# Patient Record
Sex: FEMALE | Race: BLACK OR AFRICAN AMERICAN | HISPANIC OR LATINO | Employment: FULL TIME | URBAN - METROPOLITAN AREA
[De-identification: names, ages, dates, MRNs, and addresses within clinical notes are randomized per-mention and may not be internally consistent; named-entity substitution may affect disease eponyms.]

---

## 2021-07-27 ENCOUNTER — IMMUNIZATION (OUTPATIENT)
Dept: INTERNAL MEDICINE | Facility: CLINIC | Age: 46
End: 2021-07-27
Payer: OTHER GOVERNMENT

## 2021-07-27 DIAGNOSIS — Z23 NEED FOR VACCINATION: Primary | ICD-10-CM

## 2021-07-27 PROCEDURE — 91300 COVID-19, MRNA, LNP-S, PF, 30 MCG/0.3 ML DOSE VACCINE: CPT | Mod: PBBFAC

## 2021-08-17 ENCOUNTER — IMMUNIZATION (OUTPATIENT)
Dept: PRIMARY CARE CLINIC | Facility: CLINIC | Age: 46
End: 2021-08-17
Payer: OTHER GOVERNMENT

## 2021-08-17 DIAGNOSIS — Z23 NEED FOR VACCINATION: Primary | ICD-10-CM

## 2021-08-17 PROCEDURE — 91300 COVID-19, MRNA, LNP-S, PF, 30 MCG/0.3 ML DOSE VACCINE: ICD-10-PCS | Mod: S$GLB,,, | Performed by: INTERNAL MEDICINE

## 2021-08-17 PROCEDURE — 0002A COVID-19, MRNA, LNP-S, PF, 30 MCG/0.3 ML DOSE VACCINE: ICD-10-PCS | Mod: CV19,S$GLB,, | Performed by: INTERNAL MEDICINE

## 2021-08-17 PROCEDURE — 0002A COVID-19, MRNA, LNP-S, PF, 30 MCG/0.3 ML DOSE VACCINE: CPT | Mod: CV19,S$GLB,, | Performed by: INTERNAL MEDICINE

## 2021-08-17 PROCEDURE — 91300 COVID-19, MRNA, LNP-S, PF, 30 MCG/0.3 ML DOSE VACCINE: CPT | Mod: S$GLB,,, | Performed by: INTERNAL MEDICINE

## 2022-04-21 ENCOUNTER — OFFICE VISIT (OUTPATIENT)
Dept: OPTOMETRY | Facility: CLINIC | Age: 47
End: 2022-04-21

## 2022-04-21 DIAGNOSIS — H52.4 PRESBYOPIA: Primary | ICD-10-CM

## 2022-04-21 PROCEDURE — 92004 PR EYE EXAM, NEW PATIENT,COMPREHESV: ICD-10-PCS | Mod: S$PBB,,, | Performed by: OPTOMETRIST

## 2022-04-21 PROCEDURE — 92015 DETERMINE REFRACTIVE STATE: CPT | Mod: ,,, | Performed by: OPTOMETRIST

## 2022-04-21 PROCEDURE — 99999 PR PBB SHADOW E&M-EST. PATIENT-LVL I: ICD-10-PCS | Mod: PBBFAC,,, | Performed by: OPTOMETRIST

## 2022-04-21 PROCEDURE — 92004 COMPRE OPH EXAM NEW PT 1/>: CPT | Mod: S$PBB,,, | Performed by: OPTOMETRIST

## 2022-04-21 PROCEDURE — 99211 OFF/OP EST MAY X REQ PHY/QHP: CPT | Mod: PBBFAC | Performed by: OPTOMETRIST

## 2022-04-21 PROCEDURE — 92015 PR REFRACTION: ICD-10-PCS | Mod: ,,, | Performed by: OPTOMETRIST

## 2022-04-21 PROCEDURE — 99999 PR PBB SHADOW E&M-EST. PATIENT-LVL I: CPT | Mod: PBBFAC,,, | Performed by: OPTOMETRIST

## 2022-04-21 NOTE — PROGRESS NOTES
HPI     47 Y/o female is here for routine eye exam with C/o pt is having trouble   with near vision pt states when on the computer for so long she gets a   headache  Pt denies pain and discomfort  No f/f     Eye med: no gtt     Last edited by Radha Lyn MA on 4/21/2022  8:48 AM. (History)        ROS     Negative for: Constitutional, Gastrointestinal, Neurological, Skin,   Genitourinary, Musculoskeletal, HENT, Endocrine, Cardiovascular, Eyes,   Respiratory, Psychiatric, Allergic/Imm, Heme/Lymph    Last edited by Charly Sears, OD on 4/21/2022  8:56 AM. (History)        Assessment /Plan     For exam results, see Encounter Report.    Presbyopia      Pt went to [facility] a few months ago--got her first pair of PALs, and could never adjust.  Discussed there are some people who can't get used to PALs, BUT she night do better with VARILUX.  (pt did not bring spex or written Rx with her today from that AB exam)    PLAN:    1. Wrote new spex Rx.  Pt will go to our optical to try VARILUX.  If still can't adjust OK to get reading Rx  2. rtc 1 yr

## 2024-06-11 ENCOUNTER — OFFICE VISIT (OUTPATIENT)
Dept: OPTOMETRY | Facility: CLINIC | Age: 49
End: 2024-06-11

## 2024-06-11 DIAGNOSIS — H52.4 PRESBYOPIA: Primary | ICD-10-CM

## 2024-06-11 PROCEDURE — 92015 DETERMINE REFRACTIVE STATE: CPT | Mod: ,,, | Performed by: OPTOMETRIST

## 2024-06-11 PROCEDURE — 92014 COMPRE OPH EXAM EST PT 1/>: CPT | Mod: S$PBB,,, | Performed by: OPTOMETRIST

## 2024-06-11 PROCEDURE — 99212 OFFICE O/P EST SF 10 MIN: CPT | Mod: PBBFAC,PO | Performed by: OPTOMETRIST

## 2024-06-11 PROCEDURE — 99999 PR PBB SHADOW E&M-EST. PATIENT-LVL II: CPT | Mod: PBBFAC,,, | Performed by: OPTOMETRIST

## 2024-06-11 NOTE — PROGRESS NOTES
HPI    49 Y/o female is here for routine eye exam with C/o pt states that she   lost her current Rx glasses and needs updated Rx.   Pt denies pain and discomfort   No f/f    Eye med: no gtt   Last edited by Radha Lyn MA on 6/11/2024  7:45 AM.            Assessment /Plan     For exam results, see Encounter Report.    Presbyopia      Lost spex--wrote new Rx    PLAN:    Rtc 1 yr

## 2025-01-13 ENCOUNTER — OFFICE VISIT (OUTPATIENT)
Dept: URGENT CARE | Facility: CLINIC | Age: 50
End: 2025-01-13

## 2025-01-13 VITALS
HEART RATE: 92 BPM | DIASTOLIC BLOOD PRESSURE: 79 MMHG | SYSTOLIC BLOOD PRESSURE: 119 MMHG | TEMPERATURE: 100 F | OXYGEN SATURATION: 98 % | RESPIRATION RATE: 18 BRPM | WEIGHT: 204 LBS | HEIGHT: 72 IN | BODY MASS INDEX: 27.63 KG/M2

## 2025-01-13 DIAGNOSIS — R50.9 FEVER, UNSPECIFIED FEVER CAUSE: ICD-10-CM

## 2025-01-13 DIAGNOSIS — J10.1 INFLUENZA A: Primary | ICD-10-CM

## 2025-01-13 DIAGNOSIS — R05.9 COUGH, UNSPECIFIED TYPE: ICD-10-CM

## 2025-01-13 LAB
CTP QC/QA: YES
CTP QC/QA: YES
POC MOLECULAR INFLUENZA A AGN: POSITIVE
POC MOLECULAR INFLUENZA B AGN: NEGATIVE
SARS-COV-2 AG RESP QL IA.RAPID: NEGATIVE

## 2025-01-13 PROCEDURE — 87502 INFLUENZA DNA AMP PROBE: CPT | Mod: QW,TIER,S$GLB, | Performed by: NURSE PRACTITIONER

## 2025-01-13 PROCEDURE — 99204 OFFICE O/P NEW MOD 45 MIN: CPT | Mod: TIER,S$GLB,, | Performed by: NURSE PRACTITIONER

## 2025-01-13 PROCEDURE — 87811 SARS-COV-2 COVID19 W/OPTIC: CPT | Mod: QW,TIER,S$GLB, | Performed by: NURSE PRACTITIONER

## 2025-01-13 RX ORDER — BENZONATATE 200 MG/1
200 CAPSULE ORAL 3 TIMES DAILY PRN
Qty: 30 CAPSULE | Refills: 0 | Status: SHIPPED | OUTPATIENT
Start: 2025-01-13 | End: 2025-01-23

## 2025-01-13 RX ORDER — OSELTAMIVIR PHOSPHATE 75 MG/1
75 CAPSULE ORAL 2 TIMES DAILY
Qty: 10 CAPSULE | Refills: 0 | Status: SHIPPED | OUTPATIENT
Start: 2025-01-13 | End: 2025-01-18

## 2025-01-13 RX ORDER — PROMETHAZINE HYDROCHLORIDE AND DEXTROMETHORPHAN HYDROBROMIDE 6.25; 15 MG/5ML; MG/5ML
5 SYRUP ORAL NIGHTLY PRN
Qty: 120 ML | Refills: 0 | Status: SHIPPED | OUTPATIENT
Start: 2025-01-13 | End: 2025-01-23

## 2025-01-13 RX ORDER — ACETAMINOPHEN 500 MG
1000 TABLET ORAL
Status: COMPLETED | OUTPATIENT
Start: 2025-01-13 | End: 2025-01-13

## 2025-01-13 RX ADMIN — Medication 1000 MG: at 09:01

## 2025-01-13 NOTE — LETTER
January 13, 2025      [facility]  [address] [place] [address]  Phone: [phone]  Fax: [phone]       Patient: Lillian Camarena   YOB: 1975  Date of Visit: 01/13/2025    To Whom It May Concern:    Randall Camarena  was at [facility] on 01/13/2025. Please excuse from missed work this week, may return when 24 hours fever free without the use of fever reducing medications. If you have any questions or concerns, or if I can be of further assistance, please do not hesitate to contact me.    Sincerely,            Kimmei Whiting, NP

## 2025-01-13 NOTE — PROGRESS NOTES
Subjective:      Patient ID: Lillian Camarena is a 49 y.o. female.    Vitals:  height is 6' (1.829 m) and weight is 92.5 kg (204 lb). Her oral temperature is 100.2 °F (37.9 °C). Her blood pressure is 119/79 and her pulse is 92. Her respiration is 18 and oxygen saturation is 98%.     Chief Complaint: Fever    This is a 49 y.o. female who presents today with a chief complaint of fever. Symptoms started on Saturday. Patient has nasal congestion, runny nose  post nasal drip and body aches.      Fever   This is a new problem. The current episode started 2 days ago. The problem occurs constantly. The problem has been unchanged. The maximum temperature noted was 100 to 100.9 F. The temperature was taken using an axillary reading. Associated symptoms include congestion, coughing, headaches and muscle aches. Pertinent negatives include no abdominal pain, diarrhea, ear pain, nausea, not playful when afebrile, rash, sleepiness, sore throat, urinary pain, vomiting or wheezing. She has tried antibiotics for the symptoms. The treatment provided mild relief.       Constitution: Positive for chills, fatigue and fever.   HENT:  Positive for congestion. Negative for ear pain and sore throat.    Respiratory:  Positive for cough. Negative for chest tightness, sputum production, shortness of breath and wheezing.    Gastrointestinal:  Negative for abdominal pain, nausea, vomiting and diarrhea.   Skin:  Negative for rash.   Neurological:  Positive for headaches.      Objective:     Physical Exam   Constitutional: She is oriented to person, place, and time. She appears well-developed. She is cooperative.  Non-toxic appearance. She does not appear ill. No distress.   HENT:   Head: Normocephalic and atraumatic.   Ears:   Right Ear: Hearing, tympanic membrane, external ear and ear canal normal.   Left Ear: Hearing, tympanic membrane, external ear and ear canal normal.   Nose: Nose normal. No mucosal edema, rhinorrhea or nasal deformity. No  epistaxis. Right sinus exhibits no maxillary sinus tenderness and no frontal sinus tenderness. Left sinus exhibits no maxillary sinus tenderness and no frontal sinus tenderness.   Mouth/Throat: Uvula is midline, oropharynx is clear and moist and mucous membranes are normal. No trismus in the jaw. Normal dentition. No uvula swelling. No oropharyngeal exudate, posterior oropharyngeal edema or posterior oropharyngeal erythema.   Eyes: Conjunctivae and lids are normal. No scleral icterus.   Neck: Trachea normal and phonation normal. Neck supple. No edema present. No erythema present. No neck rigidity present.   Cardiovascular: Normal rate, regular rhythm, normal heart sounds and normal pulses.   Pulmonary/Chest: Effort normal and breath sounds normal. No respiratory distress. She has no decreased breath sounds. She has no rhonchi.   Abdominal: Normal appearance.   Musculoskeletal: Normal range of motion.         General: No deformity. Normal range of motion.   Neurological: She is alert and oriented to person, place, and time. She exhibits normal muscle tone. Coordination normal.   Skin: Skin is warm, dry, intact, not diaphoretic and not pale.   Psychiatric: Her speech is normal and behavior is normal. Judgment and thought content normal.   Nursing note and vitals reviewed.      Assessment:     1. Influenza A    2. Fever, unspecified fever cause    3. Cough, unspecified type        Plan:     Tamiflu reviewed with patient.  Results for orders placed or performed in visit on 01/13/25   POCT Influenza A/B MOLECULAR    Collection Time: 01/13/25  9:51 AM   Result Value Ref Range    POC Molecular Influenza A Ag Positive (A) Negative    POC Molecular Influenza B Ag Negative Negative     Acceptable Yes    SARS Coronavirus 2 Antigen, POCT Manual Read    Collection Time: 01/13/25  9:59 AM   Result Value Ref Range    SARS Coronavirus 2 Antigen Negative Negative     Acceptable Yes        Influenza A  -      oseltamivir (TAMIFLU) 75 MG capsule; Take 1 capsule (75 mg total) by mouth 2 (two) times daily. for 5 days  Dispense: 10 capsule; Refill: 0    Fever, unspecified fever cause  -     acetaminophen tablet 1,000 mg  -     SARS Coronavirus 2 Antigen, POCT Manual Read  -     POCT Influenza A/B MOLECULAR    Cough, unspecified type  -     benzonatate (TESSALON) 200 MG capsule; Take 1 capsule (200 mg total) by mouth 3 (three) times daily as needed for Cough.  Dispense: 30 capsule; Refill: 0  -     promethazine-dextromethorphan (PROMETHAZINE-DM) 6.25-15 mg/5 mL Syrp; Take 5 mLs by mouth nightly as needed (cough).  Dispense: 120 mL; Refill: 0      Patient Instructions   Please drink plenty of fluids.  Please get plenty of rest.  Please return here or go to the Emergency Department for any concerns or worsening of condition.  Tamiflu prescription has been discussed and if prescribed, please take to completion unless you cannot tolerate the side effects.   If you were prescribed a narcotic medication, do not drive or operate heavy equipment or machinery while taking these medications.  If you were given a steroid shot in the clinic and have also been given a prescription for a steroid such as Prednisone or a Medrol Dose Pack, please begin taking them tomorrow.  If you do not have Hypertension or any history of palpitations, it is ok to take over the counter Sudafed or Mucinex D or Allegra-D or Claritin-D or Zyrtec-D.  If you do take one of the above, it is ok to combine that with plain over the counter Mucinex or Allegra or Claritin or Zyrtec.  If for example you are taking Zyrtec -D, you can combine that with Mucinex, but not Mucinex-D.  If you are taking Mucinex-D, you can combine that with plain Allegra or Claritin or Zyrtec.   If you do have Hypertension or palpitations, it is safe to take Coricidin HBP for relief of sinus symptoms.  If not allergic, please take over the counter Tylenol (Acetaminophen) and/or Motrin  (Ibuprofen) as directed for control of pain and/or fever.  Please follow up with your primary care doctor or specialist as needed.    If you  smoke, please stop smoking.

## 2025-04-15 ENCOUNTER — OFFICE VISIT (OUTPATIENT)
Dept: URGENT CARE | Facility: CLINIC | Age: 50
End: 2025-04-15
Payer: COMMERCIAL

## 2025-04-15 VITALS
TEMPERATURE: 98 F | OXYGEN SATURATION: 98 % | SYSTOLIC BLOOD PRESSURE: 139 MMHG | RESPIRATION RATE: 16 BRPM | BODY MASS INDEX: 27.63 KG/M2 | HEART RATE: 72 BPM | DIASTOLIC BLOOD PRESSURE: 89 MMHG | WEIGHT: 204 LBS | HEIGHT: 72 IN

## 2025-04-15 DIAGNOSIS — J20.9 ACUTE BRONCHITIS, UNSPECIFIED ORGANISM: Primary | ICD-10-CM

## 2025-04-15 DIAGNOSIS — R09.82 PND (POST-NASAL DRIP): ICD-10-CM

## 2025-04-15 DIAGNOSIS — H65.193 ACUTE EFFUSION OF BOTH MIDDLE EARS: ICD-10-CM

## 2025-04-15 PROBLEM — E78.2 MODERATE MIXED HYPERLIPIDEMIA NOT REQUIRING STATIN THERAPY: Status: ACTIVE | Noted: 2024-02-21

## 2025-04-15 PROCEDURE — 99213 OFFICE O/P EST LOW 20 MIN: CPT | Mod: S$GLB,,, | Performed by: PHYSICIAN ASSISTANT

## 2025-04-15 RX ORDER — BENZONATATE 200 MG/1
200 CAPSULE ORAL 3 TIMES DAILY PRN
Qty: 30 CAPSULE | Refills: 0 | Status: SHIPPED | OUTPATIENT
Start: 2025-04-15 | End: 2025-04-25

## 2025-04-15 RX ORDER — PROMETHAZINE HYDROCHLORIDE AND DEXTROMETHORPHAN HYDROBROMIDE 6.25; 15 MG/5ML; MG/5ML
5 SYRUP ORAL NIGHTLY PRN
Qty: 50 ML | Refills: 0 | Status: SHIPPED | OUTPATIENT
Start: 2025-04-15 | End: 2025-04-25

## 2025-04-15 RX ORDER — CETIRIZINE HYDROCHLORIDE 10 MG/1
10 TABLET ORAL DAILY
Qty: 7 TABLET | Refills: 0 | Status: SHIPPED | OUTPATIENT
Start: 2025-04-15 | End: 2025-04-22

## 2025-04-16 NOTE — PATIENT INSTRUCTIONS
This is a viral infection antibiotics will not be helpful against it. Use Tessalon as needed for cough.  Use cough syrup at night this can be sedating please do not drink alcohol or drive with this medication.  No indication for x-ray at this time.  Use home Flonase for postnasal drip.  Use Zyrtec as directed for fluid behind the ears.  Bronchitis can last for several weeks to several months. If your symptoms should worsen please return to the urgent care or go the emergency department for further evaluation. Use cough drops and warm salt gargles as needed for sore throat. Tylenol/ibuprofen as needed for pain/fevers. Drink plenty of fluids and get plenty of rest. F/u as needed

## 2025-04-16 NOTE — PROGRESS NOTES
Subjective:      Patient ID: Lillian Camarena is a 49 y.o. female.    Vitals:  height is 6' (1.829 m) and weight is 92.5 kg (204 lb). Her oral temperature is 98.1 °F (36.7 °C). Her blood pressure is 139/89 and her pulse is 72. Her respiration is 16 and oxygen saturation is 98%.     Chief Complaint: Cough    49 y.o female presents to urgent care with chief complaint of cough, sore throat,postnasal drip, temporal headache, nasal congestion, runny nose x 2 weeks. She mentions that when she is coughing she feels like she is choking and then throws up.She states her symptoms are worse at night when she is laying down. She mentioned having a fever once in the 1st week jaime but no more spiked of fevers. 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 She reports no body aches, fatigue, ear pain, jaw pain. She denies shortness of breath, chest pain, palpitations. No nausea, diarrhea, constipation.    Cough  This is a new problem. The current episode started 1 to 4 weeks ago. The problem has been unchanged. The problem occurs hourly. Associated symptoms include chills, headaches, nasal congestion, postnasal drip and a sore throat. Pertinent negatives include no chest pain, ear pain, fever, hemoptysis, myalgias, rash, shortness of breath or wheezing.       Constitution: Positive for chills. Negative for appetite change, sweating, fatigue, fever and generalized weakness.   HENT:  Positive for postnasal drip and sore throat. Negative for ear pain, ear discharge, dental problem, drooling, mouth sores, tongue pain, tongue lesion, congestion, sinus pain and sinus pressure.    Neck: Negative for neck pain, neck stiffness and painful lymph nodes.   Cardiovascular:  Negative for chest pain and palpitations.   Eyes:  Negative for eye discharge and eye itching.   Respiratory:  Positive for cough and sputum production. Negative for bloody sputum, shortness of breath and wheezing.    Gastrointestinal:  Positive for  vomiting. Negative for abdominal pain, nausea, constipation, diarrhea and bright red blood in stool.   Musculoskeletal:  Negative for pain and muscle ache.   Skin:  Negative for rash and abrasion.   Neurological:  Positive for headaches. Negative for dizziness and light-headedness.   Hematologic/Lymphatic: Negative for swollen lymph nodes.    History reviewed. No pertinent past medical history.    History reviewed. No pertinent surgical history.    Family History   Problem Relation Name Age of Onset    Cataracts Mother         Social History     Socioeconomic History    Marital status:    Tobacco Use    Smoking status: Never     Passive exposure: Never    Smokeless tobacco: Never     Social Drivers of Health     Financial Resource Strain: Low Risk  (6/10/2024)    Overall Financial Resource Strain (CARDIA)     Difficulty of Paying Living Expenses: Not hard at all   Food Insecurity: Food Insecurity Present (6/10/2024)    Hunger Vital Sign     Worried About Running Out of Food in the Last Year: Sometimes true     Ran Out of Food in the Last Year: Never true   Physical Activity: Inactive (6/10/2024)    Exercise Vital Sign     Days of Exercise per Week: 0 days     Minutes of Exercise per Session: 60 min   Stress: No Stress Concern Present (6/10/2024)    [employer] - Occupational Stress Questionnaire     Feeling of Stress : Only a little   Housing Stability: Unknown (6/10/2024)    Housing Stability Vital Sign     Unable to Pay for Housing in the Last Year: No       Current Medications[1]    Review of patient's allergies indicates:  No Known Allergies    Objective:     Physical Exam   Constitutional: She is oriented to person, place, and time. She appears well-developed. She is cooperative.  Non-toxic appearance. She does not appear ill. No distress.   HENT:   Head: Normocephalic and atraumatic.   Ears:   Right Ear: Hearing, external ear and ear canal normal. No no drainage,  swelling or tenderness. No foreign bodies. No mastoid tenderness. Tympanic membrane is not injected, not erythematous, not retracted and not bulging. A middle ear effusion is present. No decreased hearing is noted. no impacted cerumen  Left Ear: Hearing, external ear and ear canal normal. No no drainage, swelling or tenderness. No foreign bodies. No mastoid tenderness. Tympanic membrane is not injected, not erythematous, not retracted and not bulging. A middle ear effusion is present. No decreased hearing is noted. no impacted cerumen  Nose: Rhinorrhea present. No mucosal edema, purulent discharge, nasal deformity, septal deviation or congestion. No epistaxis. Right sinus exhibits no maxillary sinus tenderness and no frontal sinus tenderness. Left sinus exhibits no maxillary sinus tenderness and no frontal sinus tenderness.   Mouth/Throat: Uvula is midline, oropharynx is clear and moist and mucous membranes are normal. Mucous membranes are moist. No trismus in the jaw. Normal dentition. No uvula swelling. No oropharyngeal exudate, posterior oropharyngeal edema or posterior oropharyngeal erythema. Tonsils are 1+ on the right. Tonsils are 1+ on the left. No tonsillar exudate.   Eyes: Conjunctivae and lids are normal. Pupils are equal, round, and reactive to light. Right eye exhibits no discharge. Left eye exhibits no discharge. No scleral icterus.   Neck: Trachea normal and phonation normal. Neck supple. No edema present. No erythema present. No neck rigidity present.   Cardiovascular: Normal rate, regular rhythm, normal heart sounds and normal pulses.   No murmur heard.Exam reveals no gallop and no friction rub.   Pulmonary/Chest: Effort normal and breath sounds normal. No stridor. No respiratory distress. She has no decreased breath sounds. She has no wheezes. She has no rhonchi. She has no rales. She exhibits no tenderness.   Abdominal: Normal appearance.   Musculoskeletal: Normal range of motion.         General:  No deformity. Normal range of motion.      Cervical back: She exhibits no tenderness.   Lymphadenopathy:     She has no cervical adenopathy.   Neurological: She is alert and oriented to person, place, and time. She exhibits normal muscle tone. Coordination normal.   Skin: Skin is warm, dry, intact, not diaphoretic, not pale and no rash.   Psychiatric: Her speech is normal and behavior is normal. Mood, judgment and thought content normal.   Nursing note and vitals reviewed.      Assessment:     1. Acute bronchitis, unspecified organism    2. Acute effusion of both middle ears    3. PND (post-nasal drip)        Plan:       Acute bronchitis, unspecified organism  -     benzonatate (TESSALON) 200 MG capsule; Take 1 capsule (200 mg total) by mouth 3 (three) times daily as needed for Cough.  Dispense: 30 capsule; Refill: 0  -     promethazine-dextromethorphan (PROMETHAZINE-DM) 6.25-15 mg/5 mL Syrp; Take 5 mLs by mouth nightly as needed (cough).  Dispense: 50 mL; Refill: 0    Acute effusion of both middle ears  -     cetirizine (ZYRTEC) 10 MG tablet; Take 1 tablet (10 mg total) by mouth once daily. for 7 days  Dispense: 7 tablet; Refill: 0    PND (post-nasal drip)    I have reviewed the patient chart and pertinent past imaging/labs.  Pa-student fabby lange    Patient Instructions   This is a viral infection antibiotics will not be helpful against it. Use Tessalon as needed for cough.  Use cough syrup at night this can be sedating please do not drink alcohol or drive with this medication.  No indication for x-ray at this time.  Use home Flonase for postnasal drip.  Use Zyrtec as directed for fluid behind the ears.  Bronchitis can last for several weeks to several months. If your symptoms should worsen please return to the urgent care or go the emergency department for further evaluation. Use cough drops and warm salt gargles as needed for sore throat. Tylenol/ibuprofen as needed for pain/fevers. Drink plenty of fluids and  get plenty of rest. F/u as needed                           [1]  Current Outpatient Medications   Medication Sig Dispense Refill    benzonatate (TESSALON) 200 MG capsule Take 1 capsule (200 mg total) by mouth 3 (three) times daily as needed for Cough. 30 capsule 0    cetirizine (ZYRTEC) 10 MG tablet Take 1 tablet (10 mg total) by mouth once daily. for 7 days 7 tablet 0    promethazine-dextromethorphan (PROMETHAZINE-DM) 6.25-15 mg/5 mL Syrp Take 5 mLs by mouth nightly as needed (cough). 50 mL 0     No current facility-administered medications for this visit.

## 2025-04-16 NOTE — PROGRESS NOTES
Subjective:      Patient ID: Lillian Camarena is a 49 y.o. female.    Vitals:  height is 6' (1.829 m) and weight is 92.5 kg (204 lb).     Chief Complaint: Cough    This pt complains of cough x 2 weeks . S/S: body aches chills, headache, and sore throat. No nausea, vomiting, diarrhea, or fever.     Cough  This is a new problem. The current episode started 1 to 4 weeks ago. The problem has been unchanged. The problem occurs hourly. Associated symptoms include chills, headaches, nasal congestion and a sore throat.     Constitution: Positive for chills.   HENT:  Positive for sore throat.    Respiratory:  Positive for cough.    Neurological:  Positive for headaches.    Objective:     Physical Exam    Assessment:     No diagnosis found.    Plan:       There are no diagnoses linked to this encounter.

## 2025-04-16 NOTE — PROGRESS NOTES
Subjective:      Patient ID: Lillian Camarena is a 49 y.o. female.    Vitals:  height is 6' (1.829 m) and weight is 92.5 kg (204 lb).     Chief Complaint: Cough    Cough for 2 weeks. Feels like she is choking and then throwa up. Only one fever no other fevers. HA, runny nose, nasal congerstion, No diarrhea, constipation. Chills, no body aches amd no fatiguie. Sympyoms worse at night when lying dowen. Sore throat.     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 NO Wheezing , sob, cp. Palpitations. Ha in trhe temporal regions.       This pt complains of cough x 2 weeks . S/S: body aches chills, headache, and sore throat. No nausea, vomiting, diarrhea, or fever.     Cough  This is a new problem. The current episode started 1 to 4 weeks ago. The problem has been unchanged. The problem occurs hourly. Associated symptoms include chills, headaches, nasal congestion and a sore throat.       Constitution: Positive for chills.   HENT:  Positive for sore throat.    Respiratory:  Positive for cough.    Neurological:  Positive for headaches.      Objective:     Physical Exam    Assessment:     No diagnosis found.    Plan:       There are no diagnoses linked to this encounter.

## 2025-05-09 ENCOUNTER — OFFICE VISIT (OUTPATIENT)
Dept: CARDIOLOGY | Facility: CLINIC | Age: 50
End: 2025-05-09
Payer: COMMERCIAL

## 2025-05-09 VITALS
HEART RATE: 61 BPM | SYSTOLIC BLOOD PRESSURE: 135 MMHG | WEIGHT: 199.94 LBS | DIASTOLIC BLOOD PRESSURE: 85 MMHG | BODY MASS INDEX: 27.12 KG/M2

## 2025-05-09 DIAGNOSIS — R07.2 PRECORDIAL PAIN: Primary | ICD-10-CM

## 2025-05-09 DIAGNOSIS — E78.2 MIXED HYPERLIPIDEMIA: ICD-10-CM

## 2025-05-09 LAB
OHS QRS DURATION: 94 MS
OHS QTC CALCULATION: 400 MS

## 2025-05-09 PROCEDURE — 99999 PR PBB SHADOW E&M-EST. PATIENT-LVL III: CPT | Mod: PBBFAC,,, | Performed by: INTERNAL MEDICINE

## 2025-05-09 RX ORDER — METOPROLOL TARTRATE 50 MG/1
TABLET ORAL
Qty: 2 TABLET | Refills: 0 | Status: SHIPPED | OUTPATIENT
Start: 2025-05-09

## 2025-05-09 NOTE — PROGRESS NOTES
Subjective:   05/09/2025     Patient ID:  Lillian Camarena is a 49 y.o. female who presents for evaulation of Chest Pain, Shortness of Breath, and Establish Care       History of Present Illness    CHIEF COMPLAINT:  Patient presents for a cardiac evaluation due to a recent elevated BP reading at work.    HPI:  In April, Patient had a headache at work, prompting a BP check by workplace nurses. The reading was elevated, leading to a recommendation for cardiologist consultation. She has not sought medical attention for this issue prior to this appointment.    In the past week, she has had intermittent chest pain lasting 40 minutes to an hour, with associated pain in upper extremities.    She had bronchitis a few weeks ago, which resolved with medication.    Her father had heart disease at age 90, and mother had HTN without MIs.    1 year ago, labs revealed elevated cholesterol levels.    She denies dyspnea, smoking cigarettes, and constant chest pain. She also denies a history of HTN, DM, or early-onset heart disease in immediate family, taking any medication for HTN, or checking BP at home.    CARDIAC HISTORY:  EKG 05/09/2025: normal EKG 05/09/2025: normal    TEST RESULTS:  Patient's recent lab results from February 24th show a total cholesterol of 249, triglycerides of 113, HDL of 52, and LDL of 177. Her complete metabolic profile and CBC from the same date were both normal.    MEDICAL HISTORY:  Patient has a history of bronchitis, which occurred a few weeks ago. She also has a history of high cholesterol, diagnosed two years ago.    FAMILY HISTORY:  Father: heart disease at age 90  Mother: HTN    SOCIAL HISTORY:  Smoking: Denies smoking Occupation: Works as a caregiver      ROS:  General: -fever, -chills, -fatigue, -weight gain, -weight loss  Eyes: -vision changes, -redness, -discharge  ENT: -ear pain, -nasal congestion, -sore throat  Cardiovascular: +chest pain, -palpitations, -lower extremity edema  Respiratory:  "-cough, -shortness of breath  Gastrointestinal: -abdominal pain, -nausea, -vomiting, -diarrhea, -constipation, -blood in stool  Genitourinary: -dysuria, -hematuria, -frequency  Musculoskeletal: -joint pain, -muscle pain, +limb pain  Skin: -rash, -lesion  Neurological: +headache, -dizziness, -numbness, -tingling  Psychiatric: -anxiety, -depression, -sleep difficulty  Head: +head pain          Problem List[1]     Review of patient's allergies indicates:  No Known Allergies    Current Medications[2]     Objective:   Physical Exam    General: No acute distress. Well-developed. Well-nourished.  Eyes: EOMI. Sclerae anicteric.  HENT: Normocephalic. Atraumatic. Nares patent. Moist oral mucosa.  Cardiovascular: Regular rate. Regular rhythm. No murmurs. No rubs. No gallops. Normal S1, S2.  Respiratory: Normal respiratory effort. Clear to auscultation bilaterally. No rales. No rhonchi. No wheezing.  Musculoskeletal: No  obvious deformity.  Extremities: No lower extremity edema.  Neurological: Alert & oriented x3. No slurred speech. Normal gait.  Psychiatric: Normal mood. Normal affect. Good insight. Good judgment.  Skin: Warm. Dry. No rash.          Vitals:    05/09/25 0804   BP: 135/85   Pulse: 61     Wt Readings from Last 3 Encounters:   05/09/25 90.7 kg (199 lb 15.3 oz)   04/15/25 92.5 kg (204 lb)   01/13/25 92.5 kg (204 lb)     Temp Readings from Last 3 Encounters:   04/15/25 98.1 °F (36.7 °C) (Oral)   01/13/25 100.2 °F (37.9 °C) (Oral)     BP Readings from Last 3 Encounters:   05/09/25 135/85   04/15/25 139/89   01/13/25 119/79     Pulse Readings from Last 3 Encounters:   05/09/25 61   04/15/25 72   01/13/25 92               No results found for: "CHOL"  No results found for: "HDL"  No results found for: "LDLCALC"  No results found for: "ALT", "AST"  No results found for: "CREATININE", "BUN", "NA", "K", "CO2"  No results found for: "HGB", "HCT"    Assessment and Plan:   Assessment & Plan    R07.2 Precordial pain  E78.2 " Mixed hyperlipidemia    PRECORDIAL PAIN:  - Start metoprolol: Take 2 pills 1 hour before CT coronary angiogram to slow heart rate for optimal imaging.  - Ordered CT coronary angiogram to evaluate for potential coronary artery stenosis.    MIXED HYPERLIPIDEMIA:  - Ordered labs to check cholesterol levels.          No follow-ups on file.        Future Appointments   Date Time Provider Department Center   6/6/2025  8:00 AM SPECIMEN LAB, [facility] LABDRA [place]   6/13/2025  8:30 AM [facility]  CT1 [facility] CTSCAN [place]       This note was generated with the assistance of ambient listening technology. Verbal consent was obtained by the patient and accompanying visitor(s) for the recording of patient appointment to facilitate this note. I attest to having reviewed and edited the generated note for accuracy, though some syntax or spelling errors may persist. Please contact the author of this note for any clarification.                      [1]   Patient Active Problem List  Diagnosis    Mixed hyperlipidemia   [2]   Current Outpatient Medications:     cetirizine (ZYRTEC) 10 MG tablet, Take 1 tablet (10 mg total) by mouth once daily. for 7 days, Disp: 7 tablet, Rfl: 0    metoprolol tartrate (LOPRESSOR) 50 MG tablet, Take 2 tablets 1 hour prior to CT coronary angiogram, Disp: 2 tablet, Rfl: 0

## 2025-06-12 ENCOUNTER — TELEPHONE (OUTPATIENT)
Dept: CARDIOLOGY | Facility: HOSPITAL | Age: 50
End: 2025-06-12

## 2025-06-12 ENCOUNTER — PATIENT MESSAGE (OUTPATIENT)
Dept: CARDIOLOGY | Facility: HOSPITAL | Age: 50
End: 2025-06-12

## 2025-06-12 NOTE — TELEPHONE ENCOUNTER
Regarding your upcoming Cardiac CTA scheduled for 06/13/2025 at 8:30. To review, we discussed showing up 15-20 minutes before your appointment time. Your test is located at [facility] on the corner of [address] and [address], address [address], [place] [address], next door to [employer].     I have reviewed your current medications that you take and I've discussed the Cardiac CTA prep for heart rate management with Dr. Bronson, the interpreting MD. He agrees with the medication prescribed by Dr. Donaldson, but is ordering for you to take the 100mg of Metoprolol tartrate (Lopressor) 2-hours prior to the CTA. The goal for this is to maintain a desired heart rate of ~55-60 beats/minute for the duration of the test--about 40 minutes. This helps for the overall study quality and clarity.    Please ensure to complete the ordered blood work by Dr. Donaldson today if possible. This can be completed at any [facility] lab as a walk-in with no appointment necessary.    Reminder for a 4-hour fasting time, no caffeine the AM of, and you can have water, anywhere from 16-32 ounces before and after completion of the test. It is advisable to have someone transport you to and from the test as a safety precaution. Thank you again for your time today. For any questions or concerns: I am available M-F from 7:30-4, please call [phone].